# Patient Record
Sex: FEMALE | Race: WHITE | Employment: OTHER | URBAN - METROPOLITAN AREA
[De-identification: names, ages, dates, MRNs, and addresses within clinical notes are randomized per-mention and may not be internally consistent; named-entity substitution may affect disease eponyms.]

---

## 2020-08-05 ENCOUNTER — HOSPITAL ENCOUNTER (EMERGENCY)
Facility: CLINIC | Age: 70
Discharge: HOME | End: 2020-08-05
Attending: EMERGENCY MEDICINE | Admitting: EMERGENCY MEDICINE
Payer: COMMERCIAL

## 2020-08-05 ENCOUNTER — APPOINTMENT (OUTPATIENT)
Dept: MRI IMAGING | Facility: CLINIC | Age: 70
End: 2020-08-05
Attending: EMERGENCY MEDICINE
Payer: COMMERCIAL

## 2020-08-05 VITALS
OXYGEN SATURATION: 97 % | RESPIRATION RATE: 16 BRPM | HEIGHT: 64 IN | SYSTOLIC BLOOD PRESSURE: 132 MMHG | DIASTOLIC BLOOD PRESSURE: 63 MMHG | TEMPERATURE: 98.2 F | HEART RATE: 104 BPM

## 2020-08-05 DIAGNOSIS — F41.1 GAD (GENERALIZED ANXIETY DISORDER): ICD-10-CM

## 2020-08-05 DIAGNOSIS — F32.A DEPRESSION, UNSPECIFIED DEPRESSION TYPE: ICD-10-CM

## 2020-08-05 DIAGNOSIS — E87.6 HYPOKALEMIA: ICD-10-CM

## 2020-08-05 DIAGNOSIS — N30.00 ACUTE CYSTITIS WITHOUT HEMATURIA: ICD-10-CM

## 2020-08-05 LAB
ALBUMIN SERPL-MCNC: 4 G/DL (ref 3.4–5)
ALBUMIN UR-MCNC: 10 MG/DL
ALP SERPL-CCNC: 82 U/L (ref 40–150)
ALT SERPL W P-5'-P-CCNC: 20 U/L (ref 0–50)
AMPHETAMINES UR QL SCN: NEGATIVE
ANION GAP SERPL CALCULATED.3IONS-SCNC: 5 MMOL/L (ref 3–14)
APPEARANCE UR: ABNORMAL
AST SERPL W P-5'-P-CCNC: 14 U/L (ref 0–45)
BARBITURATES UR QL: NEGATIVE
BASOPHILS # BLD AUTO: 0 10E9/L (ref 0–0.2)
BASOPHILS NFR BLD AUTO: 0.4 %
BENZODIAZ UR QL: POSITIVE
BILIRUB SERPL-MCNC: 0.4 MG/DL (ref 0.2–1.3)
BILIRUB UR QL STRIP: NEGATIVE
BUN SERPL-MCNC: 7 MG/DL (ref 7–30)
CALCIUM SERPL-MCNC: 8.8 MG/DL (ref 8.5–10.1)
CANNABINOIDS UR QL SCN: NEGATIVE
CHLORIDE SERPL-SCNC: 109 MMOL/L (ref 94–109)
CO2 SERPL-SCNC: 27 MMOL/L (ref 20–32)
COCAINE UR QL: NEGATIVE
COLOR UR AUTO: YELLOW
CREAT SERPL-MCNC: 0.77 MG/DL (ref 0.52–1.04)
DIFFERENTIAL METHOD BLD: NORMAL
EOSINOPHIL # BLD AUTO: 0.2 10E9/L (ref 0–0.7)
EOSINOPHIL NFR BLD AUTO: 2.4 %
ERYTHROCYTE [DISTWIDTH] IN BLOOD BY AUTOMATED COUNT: 13.4 % (ref 10–15)
GFR SERPL CREATININE-BSD FRML MDRD: 79 ML/MIN/{1.73_M2}
GLUCOSE SERPL-MCNC: 108 MG/DL (ref 70–99)
GLUCOSE UR STRIP-MCNC: NEGATIVE MG/DL
HCT VFR BLD AUTO: 39.2 % (ref 35–47)
HGB BLD-MCNC: 13 G/DL (ref 11.7–15.7)
HGB UR QL STRIP: NEGATIVE
IMM GRANULOCYTES # BLD: 0 10E9/L (ref 0–0.4)
IMM GRANULOCYTES NFR BLD: 0 %
KETONES UR STRIP-MCNC: NEGATIVE MG/DL
LEUKOCYTE ESTERASE UR QL STRIP: ABNORMAL
LYMPHOCYTES # BLD AUTO: 2.2 10E9/L (ref 0.8–5.3)
LYMPHOCYTES NFR BLD AUTO: 31.5 %
MCH RBC QN AUTO: 29.2 PG (ref 26.5–33)
MCHC RBC AUTO-ENTMCNC: 33.2 G/DL (ref 31.5–36.5)
MCV RBC AUTO: 88 FL (ref 78–100)
MONOCYTES # BLD AUTO: 0.4 10E9/L (ref 0–1.3)
MONOCYTES NFR BLD AUTO: 5 %
MUCOUS THREADS #/AREA URNS LPF: PRESENT /LPF
NEUTROPHILS # BLD AUTO: 4.3 10E9/L (ref 1.6–8.3)
NEUTROPHILS NFR BLD AUTO: 60.7 %
NITRATE UR QL: NEGATIVE
NRBC # BLD AUTO: 0 10*3/UL
NRBC BLD AUTO-RTO: 0 /100
OPIATES UR QL SCN: NEGATIVE
PCP UR QL SCN: NEGATIVE
PH UR STRIP: 6 PH (ref 5–7)
PLATELET # BLD AUTO: 237 10E9/L (ref 150–450)
POTASSIUM SERPL-SCNC: 3.2 MMOL/L (ref 3.4–5.3)
PROT SERPL-MCNC: 7.3 G/DL (ref 6.8–8.8)
RBC # BLD AUTO: 4.45 10E12/L (ref 3.8–5.2)
RBC #/AREA URNS AUTO: 3 /HPF (ref 0–2)
SODIUM SERPL-SCNC: 141 MMOL/L (ref 133–144)
SOURCE: ABNORMAL
SP GR UR STRIP: 1.02 (ref 1–1.03)
SQUAMOUS #/AREA URNS AUTO: 1 /HPF (ref 0–1)
TSH SERPL DL<=0.005 MIU/L-ACNC: 0.86 MU/L (ref 0.4–4)
UROBILINOGEN UR STRIP-MCNC: NORMAL MG/DL (ref 0–2)
WBC # BLD AUTO: 7 10E9/L (ref 4–11)
WBC #/AREA URNS AUTO: 42 /HPF (ref 0–5)

## 2020-08-05 PROCEDURE — 70553 MRI BRAIN STEM W/O & W/DYE: CPT

## 2020-08-05 PROCEDURE — 25500064 ZZH RX 255 OP 636: Performed by: EMERGENCY MEDICINE

## 2020-08-05 PROCEDURE — 90791 PSYCH DIAGNOSTIC EVALUATION: CPT

## 2020-08-05 PROCEDURE — 85025 COMPLETE CBC W/AUTO DIFF WBC: CPT | Performed by: EMERGENCY MEDICINE

## 2020-08-05 PROCEDURE — 87086 URINE CULTURE/COLONY COUNT: CPT | Performed by: EMERGENCY MEDICINE

## 2020-08-05 PROCEDURE — 25000132 ZZH RX MED GY IP 250 OP 250 PS 637: Performed by: EMERGENCY MEDICINE

## 2020-08-05 PROCEDURE — U0003 INFECTIOUS AGENT DETECTION BY NUCLEIC ACID (DNA OR RNA); SEVERE ACUTE RESPIRATORY SYNDROME CORONAVIRUS 2 (SARS-COV-2) (CORONAVIRUS DISEASE [COVID-19]), AMPLIFIED PROBE TECHNIQUE, MAKING USE OF HIGH THROUGHPUT TECHNOLOGIES AS DESCRIBED BY CMS-2020-01-R: HCPCS | Performed by: EMERGENCY MEDICINE

## 2020-08-05 PROCEDURE — 84443 ASSAY THYROID STIM HORMONE: CPT | Performed by: EMERGENCY MEDICINE

## 2020-08-05 PROCEDURE — 81001 URINALYSIS AUTO W/SCOPE: CPT | Mod: XU | Performed by: EMERGENCY MEDICINE

## 2020-08-05 PROCEDURE — 80307 DRUG TEST PRSMV CHEM ANLYZR: CPT | Performed by: EMERGENCY MEDICINE

## 2020-08-05 PROCEDURE — 99285 EMERGENCY DEPT VISIT HI MDM: CPT | Mod: 25

## 2020-08-05 PROCEDURE — A9585 GADOBUTROL INJECTION: HCPCS | Performed by: EMERGENCY MEDICINE

## 2020-08-05 PROCEDURE — 80053 COMPREHEN METABOLIC PANEL: CPT | Performed by: EMERGENCY MEDICINE

## 2020-08-05 PROCEDURE — C9803 HOPD COVID-19 SPEC COLLECT: HCPCS

## 2020-08-05 RX ORDER — CEPHALEXIN 500 MG/1
500 CAPSULE ORAL ONCE
Status: COMPLETED | OUTPATIENT
Start: 2020-08-05 | End: 2020-08-05

## 2020-08-05 RX ORDER — CEPHALEXIN 500 MG/1
500 CAPSULE ORAL 2 TIMES DAILY
Qty: 13 CAPSULE | Refills: 0 | Status: SHIPPED | OUTPATIENT
Start: 2020-08-05 | End: 2020-08-12

## 2020-08-05 RX ORDER — GADOBUTROL 604.72 MG/ML
7 INJECTION INTRAVENOUS ONCE
Status: COMPLETED | OUTPATIENT
Start: 2020-08-05 | End: 2020-08-05

## 2020-08-05 RX ORDER — POTASSIUM CHLORIDE 1.5 G/1.58G
40 POWDER, FOR SOLUTION ORAL ONCE
Status: COMPLETED | OUTPATIENT
Start: 2020-08-05 | End: 2020-08-05

## 2020-08-05 RX ADMIN — GADOBUTROL 7 ML: 604.72 INJECTION INTRAVENOUS at 19:49

## 2020-08-05 RX ADMIN — CEPHALEXIN 500 MG: 500 CAPSULE ORAL at 22:41

## 2020-08-05 RX ADMIN — POTASSIUM CHLORIDE 40 MEQ: 1.5 POWDER, FOR SOLUTION ORAL at 21:09

## 2020-08-05 ASSESSMENT — ENCOUNTER SYMPTOMS
COUGH: 1
NERVOUS/ANXIOUS: 1
FEVER: 0
UNEXPECTED WEIGHT CHANGE: 1
SHORTNESS OF BREATH: 1
SLEEP DISTURBANCE: 1
APPETITE CHANGE: 1
HALLUCINATIONS: 1
ABDOMINAL PAIN: 0
CONFUSION: 1

## 2020-08-05 NOTE — ED PROVIDER NOTES
History   Chief Complaint  Hallucinations and Anxiety    The history is provided by the patient.      Enid Kendall is a 70 year old female with a history of anxiety, depression, and cognitive impairment who presents for evaluation of increased anxiety and auditory hallucinations. She has struggled with anxiety for a while now, but this has increased with the pandemic and riots. It has also been particularly heightened in the last few weeks with her nephew passing away. She has started having auditory hallucinations of people coming to hurt their dog. The  notes two episodes of visual hallucinations as well; once when she thought people were in their room and another time she thought a police car was outside. She endorses some thoughts of injuring herself. The  also has concern over her recent weight loss and loss of appetite. About a year ago she was purposely trying to lose weight, but has concern it has gone too far.     Her sertraline dose was increased from 150 mg to 200 mg recently. She has also been titration up on Seroquel recently. She was also started on a medication for sleep recently. This helped for the first few night, but she is now waking up again.     She also has a minor sore throat and some shortness of breath, although believes this is from her asthma. She denies fever, abdominal pain.     Allergies  Codeine  Contrast Dye    Medications  Albuterol  Advair  Lipitor  Lisinopril-hydrochlorothiazide  Zoloft  Gabapentin  Seroquel    Past Medical History  Osteopenia  Mild cognitive impairment  Anxiety  Hyperlipidemia  Hypertension  Depression  Impaired fasting glucose  Asthma   Depression  Cervical radiculopathy  Herpes simplex labials  Angina pectoris     Past Surgical History  Appendectomy  Coronary angiogram  Cholecystectomy  Laser mosaic plantar warts and excision lipomas  Total abdominal hysterectomy    Family History  Father - CAD, hypertension  Mother - breast  "cancer  Sister(s) - melanoma, heart attack  Brother - esophageal cancer   Son - depression    Social History  The patient was accompanied to the ED by her .  Smoking Status: never  Smokeless Tobacco: no  Alcohol Use: no  Drug Use: no    Review of Systems   Constitutional: Positive for appetite change and unexpected weight change. Negative for fever.   Respiratory: Positive for cough and shortness of breath.    Gastrointestinal: Negative for abdominal pain.   Psychiatric/Behavioral: Positive for confusion, hallucinations and sleep disturbance. The patient is nervous/anxious.    All other systems reviewed and are negative.      Physical Exam     Patient Vitals for the past 24 hrs:   BP Temp Temp src Pulse Resp SpO2 Height   08/05/20 1641 132/63 98.2  F (36.8  C) Oral 104 16 98 % 1.626 m (5' 4\")       Physical Exam  Constitutional: Well developed, nontox appearance  Head: Atraumatic.  Neck:  no stridor  Eyes: no scleral icterus  Cardiovascular: Borderline regular tachycardia, 2+ bilat radial pulses  Pulmonary/Chest: nml resp effort  Abdominal: ND, soft, NT, no rebound or guarding   Ext: Warm, well perfused, no edema  Neurological: Alert, oriented x2, symmetric facies, moves ext x4  Skin: Skin is warm and dry.   Psychiatric: Severely anxious, endorses SI without plan.  Denies HI, does not appear to be responding to internal stimuli  Nursing note and vitals reviewed.    Emergency Department Course   Imaging:  Radiology findings were communicated with the patient and spouse who voiced understanding of the findings.    MR brain w/o and w contrast:  IMPRESSION:   1.  No acute pathology, no bleed, mass, or acute infarcts.   2. There is diffuse parenchymal volume loss.  White matter changes are   present in the cerebral hemispheres that are consistent with small   vessel ischemic disease in this age patient.   Readings per Radiology    Laboratory:  Laboratory findings were communicated with the patient who voiced " understanding of the findings.    Drug abuse screen 77 urine: positive for benzodiazepine o/w negative  UA with Microscopic: protein albumin 10 (A), leukocyte esterase large (A), WBC/HPF 42 (H), RBC/HPF 3 (H), mucous present (A) o/w WNL    CMP: glucose 108 (H), potassium 3.2 (L) o/w WNL (Creatinine 0.77)  CBC: AWNL (WBC 7.0, HGB 13.0, )  TSH with free T4 reflex: 0.86    Asymptomatic COVID-19 virus by PCR: pending    Interventions:   Klor-Con 40 mEq po    Emergency Department Course:  Past medical records, nursing notes, and vitals reviewed.    1720 I physically examined the patient as documented above.    IV was inserted and blood was drawn for laboratory testing, results above.    The patient provided a urine sample here in the emergency department. This was sent for laboratory testing, findings above.    The patient was sent for radiographs while in the emergency department, results above.     A Nasopharyngeal swab was obtained here in the ED.    2120 I rechecked the patient and discussed the findings of their workup thus far.     Findings and plan explained to the Patient and spouse. Patient discharged home with instructions regarding supportive care, medications, and reasons to return. The importance of close follow-up was reviewed. The patient was prescribed Keflex.     I personally reviewed the laboratory and imaging results with the Patient and spouse and answered all related questions prior to discharge.     Impression & Plan   Medical Decision Makin-year-old female presenting with severe anxiety, auditory hallucinations, confusion     Differential diagnosis includes generalized anxiety disorder, anxiety NOS, major depressive disorder, psychiatric disorder NOS, electrolyte abnormality, intracranial abnormality such as mass; infection such as UTI although seemingly less likely play acute role in the patient's presentation given duration of gradually worsening anxiety, stressors and lack of  acute delirium.  Labs significant for mild hypokalemia, UA consistent with infection.  MRI demonstrated no acute abnormality, evidence of CVA or mass.  Given the patient's profound anxiety, difficulty sleeping and multiple recent medication changes, I think would be reasonable to have the patient evaluated by DEC for disposition planning inpatient psychiatric admission versus outpatient follow-up.  Given the patient's intermittent suicidal thoughts, although she reports that she would not act on them, inpatient admission would potentially be a viable option.  DEC evaluated the patient, and given the patient has no desire for admission and can likely be safe at home, plan for discharge from ED w/ safety plan and f/u with psychiatry which I think is reasonable.  Prescription written as noted below for UTI treatment.  At this time I feel the pt is safe for discharge.  Recommendations given regarding follow up with PCP and return to the emergency department as needed for new or worsening symptoms.  Pt counseled on all results, disposition and diagnosis.  They are understanding and agreeable to plan. Patient discharged in stable condition.        Diagnosis:    ICD-10-CM    1. LAURI (generalized anxiety disorder)  F41.1    2. Depression, unspecified depression type  F32.9    3. Acute cystitis without hematuria  N30.00    Hypokalemia    Disposition:  Discharged to home.    Discharge Medications:  New Prescriptions    CEPHALEXIN (KEFLEX) 500 MG CAPSULE    Take 1 capsule (500 mg) by mouth 2 times daily for 13 doses       Scribe Disclosure:  I, Louise Yeung, am serving as a scribe at 5:09 PM on 8/5/2020 to document services personally performed by Desmond Waldrop MD based on my observations and the provider's statements to me.      Desmond Waldrop MD  08/06/20 0001

## 2020-08-05 NOTE — ED TRIAGE NOTES
Pt here with her , she has anxiety and it has increased since all the world changes with covid and a family death. Her hallucinations are voices stating they will hurt her dog( s)  Pt and her  had video consult with her psychiatrist who after 30 min determined pt needs to be seen . meds have been getting increased over the last few months and pt also complains of dizziness and poor appetite.

## 2020-08-05 NOTE — ED AVS SNAPSHOT
Emergency Department  [address] [place] [id number]  Phone:  [phone]  Fax:  [phone]                                    Enid Kendall   MRN: [id number]    Department:   Emergency Department   Date of Visit:  8/5/2020           After Visit Summary Signature Page    I have received my discharge instructions, and my questions have been answered. I have discussed any challenges I see with this plan with the nurse or doctor.    ..........................................................................................................................................  Patient/Patient Representative Signature      ..........................................................................................................................................  Patient Representative Print Name and Relationship to Patient    ..................................................               ................................................  Date                                   Time    ..........................................................................................................................................  Reviewed by Signature/Title    ...................................................              ..............................................  Date                                               Time          22EPIC Rev 08/18

## 2020-08-05 NOTE — ED NOTES
Bed: ED22  Expected date:   Expected time:   Means of arrival:   Comments:  Hold  Mental health in triage

## 2020-08-06 LAB
LABORATORY COMMENT REPORT: NORMAL
SARS-COV-2 RNA SPEC QL NAA+PROBE: NEGATIVE
SARS-COV-2 RNA SPEC QL NAA+PROBE: NORMAL
SPECIMEN SOURCE: NORMAL
SPECIMEN SOURCE: NORMAL

## 2020-08-06 NOTE — RESULT ENCOUNTER NOTE
Emergency Dept/Urgent Care discharge antibiotic (if prescribed): Cephalexin (Keflex) 500 mg capsule, 1 capsule (500 mg) by mouth 2 times daily for 7 days.  Date of Rx (if applicable):  8/5/20  No changes in treatment per Urine culture protocol.

## 2020-08-06 NOTE — DISCHARGE INSTRUCTIONS
Please return to the emergency department as needed for new or worsening symptoms including worsening anxiety, worsening thoughts of self-harm or suicide, worsening hallucinations, inability to function at home, fever greater than 100.4  F, vomiting unable to keep anything down, fainting, any other concerning symptoms.        Discharge Instructions  Urinary Tract Infection  You or your child have been diagnosed with a urinary tract infection, or UTI. The urinary tract includes the kidneys (which make urine/pee), ureters (the tubes that carry urine/pee from the kidneys to the bladder), the bladder (which stores urine/pee), and urethra (the tube that carries urine/pee out of the bladder). Urinary tract infections occur when bacteria travel up the urethra into the bladder (bladder infection) and, in some cases, from there into the kidneys (kidney infection).  Generally, every Emergency Department visit should have a follow-up clinic visit with either a primary or a specialty clinic/provider. Please follow-up as instructed by your emergency provider today.  Return to the Emergency Department if:  You or your child have severe back pain.  You or your child are vomiting (throwing up) so that you cannot take your medicine.  You or your child have a new fever (had not previously had a fever) over 101 F.  You or your child have confusion or are very weak, or feel very ill.  Your child seems much more ill, will not wake up, will not respond right, or is crying for a long time and will not calm down.  You or your child are showing signs of dehydration. These signs may include decreased urination (pee), dry mouth/gums/tongue, or decreased activity.    Follow-up with your provider:   Children under 24 months need to be seen by their regular provider within one week after a diagnosis of a UTI. It may be necessary to do some more tests to look at the child s kidney or bladder.  You should begin to feel better within 24 - 48 hours  of starting your antibiotic; follow-up with your regular clinic/doctor/provider if this is not the case.    Treatment:   You will be treated with an antibiotic to kill the bacteria. We have to make an educated guess, based on what we know about common bacteria and antibiotics, as to which antibiotic will work for your infection. We will be correct most times but there will be some cases where the antibiotic chosen is not correct (see urine cultures below).  Take a pain medication such as acetaminophen (Tylenol ) or ibuprofen (Advil , Motrin , Nuprin ).  Phenazopyridine (Pyridium , Uristat ) is a prescription medication that numbs the bladder to reduce the burning pain of some UTIs.  The same medication is available in a non-prescription version (Azo-Standard , Urodol ). This medication will change the color of the urine and tears (usually blue or orange). If you wear contacts, do not wear them while taking this medication as they may be stained by the medication.    Urine Cultures:  If indicated, a urine culture may have been performed today. This test generally takes 24-48 hours to complete so the results are not known at this time. The results can confirm that an infection is present but also determine which antibiotic is effective for the specific bacteria that is causing the infection. If your urine culture shows that the antibiotic you were given today will not work to treat your infection, we will attempt to contact you to make arrangements to change the antibiotic. If the culture confirms that the antibiotic is effective for your infection, you will not be contacted. We often recommend follow-up with your regular physician/provider on the culture results regardless of this process.    Antibiotic Warning:   If you have been placed on antibiotics - watch for signs of allergic reaction.  These include rash, lip swelling, difficulty breathing, wheezing, and dizziness.  If you develop any of these symptoms, stop  "the antibiotic immediately and go to an emergency room or urgent care for evaluation.    Probiotics: If you have been given an antibiotic, you may want to also take a probiotic pill or eat yogurt with live cultures. Probiotics have \"good bacteria\" to help your intestines stay healthy. Studies have shown that probiotics help prevent diarrhea and other intestine problems (including C. diff infection) when you take antibiotics. You can buy these without a prescription in the pharmacy section of the store.   If you were given a prescription for medicine here today, be sure to read all of the information (including the package insert) that comes with your prescription.  This will include important information about the medicine, its side effects, and any warnings that you need to know about.  The pharmacist who fills the prescription can provide more information and answer questions you may have about the medicine.  If you have questions or concerns that the pharmacist cannot address, please call or return to the Emergency Department.   Remember that you can always come back to the Emergency Department if you are not able to see your regular provider in the amount of time listed above, if you get any new symptoms, or if there is anything that worries you.    "

## 2020-08-08 LAB
BACTERIA SPEC CULT: NORMAL
BACTERIA SPEC CULT: NORMAL
Lab: NORMAL
SPECIMEN SOURCE: NORMAL

## 2020-08-09 ENCOUNTER — TELEPHONE (OUTPATIENT)
Dept: EMERGENCY MEDICINE | Facility: CLINIC | Age: 70
End: 2020-08-09

## 2020-08-09 NOTE — TELEPHONE ENCOUNTER
[facility] Emergency Department Lab result notification     Patient/parent Name  tanya Iyer    Reason for call  Patient's  requesting lab result    Lab Result  Final urine culture report is NEGATIVE per [facility] ED Lab Result protocol.     If NEGATIVE result, no change in treatment, per [facility] ED Lab Result protocol.    Current symptoms  10:48AM: Patient's  requesting lab result.   Recommendations/Instructions  Result given to patient's  with the patient's verbal permission.  Advised to follow up with the PCP as directed by the ED provider.  The  is comfortable with the information given and has no further questions.     Contact your PCP clinic or return to the Emergency department if your:    Symptoms worsen or other concerning symptom's.    PCP follow-up Questions asked: YES       [RN Name]  Kady Hill RN  [facility] RN  Lung Nodule and ED Lab Result RN  Epic pool (ED late result f/u RN): [id number]  [facility] INCIDENTAL RADIOLOGY F/U NURSES: [id number]  # [phone]      Copy of Lab result   Urine Culture   Order: [id number]   Status:  Final result   Visible to patient:  Yes (MyChart) Dx:  LAURI (generalized anxiety disorder)   Specimen Information:  [facility] Urine          Component  4d ago   Specimen Description  [facility] Urine     Special Requests  Specimen received in preservative     Culture Micro  50,000 to 100,000 colonies/mL   urogenital adry   Susceptibility testing not routinely done    Culture Micro  <10,000 colonies/mL   mixed urogenital adry   Susceptibility testing not routinely done    [facility]          Specimen Collected: 08/05/20  5:59 PM  Last Resulted: 08/08/20  4:04 AM

## 2020-09-12 ENCOUNTER — APPOINTMENT (OUTPATIENT)
Dept: GENERAL RADIOLOGY | Facility: CLINIC | Age: 70
End: 2020-09-12
Attending: EMERGENCY MEDICINE
Payer: COMMERCIAL

## 2020-09-12 ENCOUNTER — APPOINTMENT (OUTPATIENT)
Dept: CT IMAGING | Facility: CLINIC | Age: 70
End: 2020-09-12
Attending: EMERGENCY MEDICINE
Payer: COMMERCIAL

## 2020-09-12 ENCOUNTER — HOSPITAL ENCOUNTER (EMERGENCY)
Facility: CLINIC | Age: 70
Discharge: HOME | End: 2020-09-12
Attending: EMERGENCY MEDICINE | Admitting: EMERGENCY MEDICINE
Payer: COMMERCIAL

## 2020-09-12 VITALS
DIASTOLIC BLOOD PRESSURE: 56 MMHG | HEIGHT: 64 IN | RESPIRATION RATE: 16 BRPM | TEMPERATURE: 97.2 F | SYSTOLIC BLOOD PRESSURE: 117 MMHG | HEART RATE: 80 BPM | OXYGEN SATURATION: 100 %

## 2020-09-12 DIAGNOSIS — S40.022A ARM CONTUSION, LEFT, INITIAL ENCOUNTER: ICD-10-CM
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
## 2020-09-12 LAB
ANION GAP SERPL CALCULATED.3IONS-SCNC: 4 MMOL/L (ref 3–14)
BASOPHILS # BLD AUTO: 0 10E9/L (ref 0–0.2)
BASOPHILS NFR BLD AUTO: 0.5 %
BUN SERPL-MCNC: 12 MG/DL (ref 7–30)
CALCIUM SERPL-MCNC: 8 MG/DL (ref 8.5–10.1)
CHLORIDE SERPL-SCNC: 108 MMOL/L (ref 94–109)
CO2 SERPL-SCNC: 26 MMOL/L (ref 20–32)
CREAT SERPL-MCNC: 0.79 MG/DL (ref 0.52–1.04)
DIFFERENTIAL METHOD BLD: NORMAL
EOSINOPHIL # BLD AUTO: 0.2 10E9/L (ref 0–0.7)
EOSINOPHIL NFR BLD AUTO: 2.9 %
ERYTHROCYTE [DISTWIDTH] IN BLOOD BY AUTOMATED COUNT: 13.7 % (ref 10–15)
GFR SERPL CREATININE-BSD FRML MDRD: 76 ML/MIN/{1.73_M2}
GLUCOSE SERPL-MCNC: 124 MG/DL (ref 70–99)
HCT VFR BLD AUTO: 35.8 % (ref 35–47)
HGB BLD-MCNC: 11.9 G/DL (ref 11.7–15.7)
IMM GRANULOCYTES # BLD: 0 10E9/L (ref 0–0.4)
IMM GRANULOCYTES NFR BLD: 0.2 %
INTERPRETATION ECG - MUSE: NORMAL
LYMPHOCYTES # BLD AUTO: 2.2 10E9/L (ref 0.8–5.3)
LYMPHOCYTES NFR BLD AUTO: 26.8 %
MCH RBC QN AUTO: 29 PG (ref 26.5–33)
MCHC RBC AUTO-ENTMCNC: 33.2 G/DL (ref 31.5–36.5)
MCV RBC AUTO: 87 FL (ref 78–100)
MONOCYTES # BLD AUTO: 0.5 10E9/L (ref 0–1.3)
MONOCYTES NFR BLD AUTO: 6.4 %
NEUTROPHILS # BLD AUTO: 5.2 10E9/L (ref 1.6–8.3)
NEUTROPHILS NFR BLD AUTO: 63.2 %
NRBC # BLD AUTO: 0 10*3/UL
NRBC BLD AUTO-RTO: 0 /100
PLATELET # BLD AUTO: 231 10E9/L (ref 150–450)
POTASSIUM SERPL-SCNC: 3.4 MMOL/L (ref 3.4–5.3)
RBC # BLD AUTO: 4.1 10E12/L (ref 3.8–5.2)
SODIUM SERPL-SCNC: 138 MMOL/L (ref 133–144)
WBC # BLD AUTO: 8.3 10E9/L (ref 4–11)

## 2020-09-12 PROCEDURE — 73060 X-RAY EXAM OF HUMERUS: CPT | Mod: LT

## 2020-09-12 PROCEDURE — 80048 BASIC METABOLIC PNL TOTAL CA: CPT | Performed by: EMERGENCY MEDICINE

## 2020-09-12 PROCEDURE — 70450 CT HEAD/BRAIN W/O DYE: CPT

## 2020-09-12 PROCEDURE — 85025 COMPLETE CBC W/AUTO DIFF WBC: CPT | Performed by: EMERGENCY MEDICINE

## 2020-09-12 PROCEDURE — 73030 X-RAY EXAM OF SHOULDER: CPT | Mod: LT

## 2020-09-12 PROCEDURE — 72125 CT NECK SPINE W/O DYE: CPT

## 2020-09-12 PROCEDURE — 71046 X-RAY EXAM CHEST 2 VIEWS: CPT

## 2020-09-12 PROCEDURE — 99285 EMERGENCY DEPT VISIT HI MDM: CPT | Mod: 25

## 2020-09-12 PROCEDURE — 93005 ELECTROCARDIOGRAM TRACING: CPT

## 2020-09-12 ASSESSMENT — ENCOUNTER SYMPTOMS: MYALGIAS: 1

## 2020-09-12 NOTE — ED PROVIDER NOTES
History     Chief Complaint:  Fall     The history is provided by the patient and the spouse. The history is limited by the condition of the patient.      Enid Kendall is a 70 year old female with a history of anxiety, depression, and cognitive impairment who presents with left upper arm pain and left sided chest pain after a fall. The patient says that she does not recall how she fell, but that it was dark and she likely tripped on something. She endorses some dizziness with sitting up.     Per the patient's , the patient's takes Seroquel at night to calm her anxiety. Occasionally it does not work as desired and makes the patient groggy. He says that when that happened the patient gets paranoid and has hallucinations. The  says that what likely happened was that the patient got out of bed because she was hearing voices and she tripped and fell.     Allergies:  Codeine  Contrast Dye     Medications:    Zoloft  Valtrex  Lisinopril hydrochlorothiazide  Lipitor  Advair  Albuterol  Seroquel  Gabapentin   Aricept     Past Medical History:    Osteopenia  Mild cognitive impairment  Anxiety  Hyperlipidemia  Hypertension  Depression  Impaired fasting glucose  Asthma   Depression  Cervical radiculopathy  Herpes simplex labials  Angina pectoris     Past Surgical History:    Appendectomy  Coronary angiogram  Cholecystectomy  Laser mosaic plantar warts and excision lipomas  Total abdominal hysterectomy     Family History:    CAD  Hypertension   Breast cancer  Melanoma  Heart attack  Esophageal cancer  Depression      Social History:  Smoking Status: Never Smoker  Alcohol Use: No  PCP: Usha Rocha     Review of Systems   Cardiovascular: Positive for chest pain.   Musculoskeletal: Positive for myalgias.   ROS limited due to condition of the patient.         Physical Exam     Patient Vitals for the past 24 hrs:   BP Temp Temp src Pulse Resp SpO2 Height   09/12/20 0440 -- -- -- 74 12 97 % --   09/12/20  "0415 -- -- -- 68 11 98 % --   09/12/20 0405 115/50 -- -- 66 20 99 % --   09/12/20 0220 109/46 97.2  F (36.2  C) Temporal 90 16 99 % 1.626 m (5' 4\")        Physical Exam  General: Appears well-developed and well-nourished.   Head: No signs of trauma.   Mouth/Throat: Oropharynx is clear and moist.   Eyes: Conjunctivae are normal. Pupils are equal, round, and reactive to light.   Neck: Normal range of motion. No point tenderness.  CV: Normal rate and regular rhythm.    Resp: Effort normal and breath sounds normal. No respiratory distress.   GI: Soft. There is no tenderness.  No rebound or guarding.  Normal bowel sounds.    MSK: Normal range of motion. No point tenderness to extremities.  Ecchymosis to left bicep region.  Distal pulses intact.  Normal strength, cap refill, and sensation distally.  Neuro: The patient is alert and conversant, but poor overall historian.  PERRLA, EOMI, strength in upper/lower extremities normal and symmetrical. Sensation normal. Speech normal.  Skin: Skin is warm and dry. No rash noted.   Psych: normal mood and affect. behavior is normal.       Emergency Department Course     ECG:  ECG taken at 0238, ECG read at 0238  Normal sinus rhythm  Normal ECG  No significant change compared to EKG dated 10/30/1995  Rate 80 bpm. GA interval 124 ms. QRS duration 90 ms. QT/QTc 402/463 ms. P-R-T axes 55 43 39.    Imaging:  Radiology findings were communicated with the patient and  who voiced understanding of the findings.    CT Cervical Spine w/o Contrast  1.  No evidence of an acute osseous abnormality of the cervical spine.  2.  Heterogeneous appearance of the thyroid gland without a definite discrete nodule. Findings may reflect thyroiditis.  Reading per radiology    CT Head w/o Contrast  1.  No CT evidence for acute intracranial process.  2.  Brain atrophy and presumed chronic microvascular ischemic changes as above.  Reading per radiology    Chest XR,  PA & LAT  No radiographic evidence of " acute trauma in the chest.   Reading per radiology    XR Shoulder Left G/E 3 Views  1. No visualized acute fracture or malalignment of the left shoulder or humerus.  2. Moderate degenerative changes in the left acromioclavicular joint.  3. Atherosclerotic calcification in the thoracic aorta.   Reading per radiology    Humerus XR,  G/E 2 views, left  1. No visualized acute fracture or malalignment of the left shoulder or humerus.  2. Moderate degenerative changes in the left acromioclavicular joint.  3. Atherosclerotic calcification in the thoracic aorta.   Reading per radiology     Laboratory:  Laboratory findings were communicated with the patient who voiced understanding of the findings.    CBC: WBC 8.3, HGB 11.9,   BMP:  (H), calcium 8.0 (L) o/w WNL (Creatinine 0.79)    Procedures    Emergency Department Course:    0226 Nursing notes and vitals reviewed.    0238 I performed an exam of the patient as documented above. IV was inserted and blood was drawn for laboratory testing, results above.     0332 The patient was sent for a XR while in the emergency department, results above.      0352 The patient was sent for a CT while in the emergency department, results above.      0440 Patient rechecked and updated.       Findings and plan explained to the Patient and spouse. Patient discharged home with instructions regarding supportive care, medications, and reasons to return. The importance of close follow-up was reviewed.     Impression & Plan        Medical Decision Making:  Enid Kendall is a 70 year old female who presents to the emergency department today for evaluation after a fall.  Exact details of the fall not clear although patient is does apparently have some developing dementia and also uses Seroquel to help her sleep, but occasionally she has increased anxiety and hallucinations that she is working with a psychiatrist with.  Apparently this was the case this evening.  On my evaluation she  did have some ecchymosis to the left bicep area and complained of some pain in that region.  At one point she did complain of some neck pain although I did not elicit any on my evaluation.  There is no other clear signs of trauma that I was able to note.  Imaging was obtained of the affected areas and this did not show any signs of acute process.  Patient continued not have any further complaints and desired to go home and patient's  was comfortable with this as well.  I did recommend that he speak with the patient's psychiatrist to discuss continued management of her anxiety/paranoia symptoms and to follow-up with her primary care doctor.        Diagnosis:    ICD-10-CM    1. Fall, initial encounter  W19.XXXA    2. Arm contusion, left, initial encounter  S40.022A      Disposition:   The patient is discharged to home.     Discharge Medications:  New Prescriptions    No medications on file       Scribe Disclosure:  I, Sukhdeep Escamilla, am serving as a scribe at 2:37 AM on 9/12/2020 to document services personally performed by Fidel Donaldson MD based on my observations and the provider's statements to me.        Fidel Donaldson MD  09/13/20 0034

## 2020-09-12 NOTE — ED AVS SNAPSHOT
Emergency Department  [address] [place] [address]  Phone:  [phone]  Fax:  [phone]                                    Enid Kendall   MRN: [id number]    Department:   Emergency Department   Date of Visit:  9/12/2020           After Visit Summary Signature Page    I have received my discharge instructions, and my questions have been answered. I have discussed any challenges I see with this plan with the nurse or doctor.    ..........................................................................................................................................  Patient/Patient Representative Signature      ..........................................................................................................................................  Patient Representative Print Name and Relationship to Patient    ..................................................               ................................................  Date                                   Time    ..........................................................................................................................................  Reviewed by Signature/Title    ...................................................              ..............................................  Date                                               Time          22EPIC Rev 08/18

## 2020-09-15 ENCOUNTER — HOSPITAL ENCOUNTER (EMERGENCY)
Facility: CLINIC | Age: 70
Discharge: HOME | End: 2020-09-15
Attending: EMERGENCY MEDICINE | Admitting: EMERGENCY MEDICINE
Payer: COMMERCIAL

## 2020-09-15 VITALS
TEMPERATURE: 98.2 F | RESPIRATION RATE: 14 BRPM | OXYGEN SATURATION: 96 % | SYSTOLIC BLOOD PRESSURE: 120 MMHG | DIASTOLIC BLOOD PRESSURE: 66 MMHG | HEART RATE: 76 BPM

## 2020-09-15 DIAGNOSIS — F41.1 ANXIETY REACTION: ICD-10-CM

## 2020-09-15 LAB
ALBUMIN UR-MCNC: NEGATIVE MG/DL
ANION GAP SERPL CALCULATED.3IONS-SCNC: 5 MMOL/L (ref 3–14)
APPEARANCE UR: CLEAR
BASOPHILS # BLD AUTO: 0 10E9/L (ref 0–0.2)
BASOPHILS NFR BLD AUTO: 0.3 %
BILIRUB UR QL STRIP: NEGATIVE
BUN SERPL-MCNC: 14 MG/DL (ref 7–30)
CALCIUM SERPL-MCNC: 8.1 MG/DL (ref 8.5–10.1)
CHLORIDE SERPL-SCNC: 111 MMOL/L (ref 94–109)
CO2 SERPL-SCNC: 25 MMOL/L (ref 20–32)
COLOR UR AUTO: NORMAL
CREAT SERPL-MCNC: 0.79 MG/DL (ref 0.52–1.04)
DIFFERENTIAL METHOD BLD: ABNORMAL
EOSINOPHIL # BLD AUTO: 0.3 10E9/L (ref 0–0.7)
EOSINOPHIL NFR BLD AUTO: 4.4 %
ERYTHROCYTE [DISTWIDTH] IN BLOOD BY AUTOMATED COUNT: 13.8 % (ref 10–15)
GFR SERPL CREATININE-BSD FRML MDRD: 76 ML/MIN/{1.73_M2}
GLUCOSE SERPL-MCNC: 98 MG/DL (ref 70–99)
GLUCOSE UR STRIP-MCNC: NEGATIVE MG/DL
HCT VFR BLD AUTO: 34.4 % (ref 35–47)
HGB BLD-MCNC: 11.5 G/DL (ref 11.7–15.7)
HGB UR QL STRIP: NEGATIVE
IMM GRANULOCYTES # BLD: 0 10E9/L (ref 0–0.4)
IMM GRANULOCYTES NFR BLD: 0.2 %
KETONES UR STRIP-MCNC: NEGATIVE MG/DL
LEUKOCYTE ESTERASE UR QL STRIP: NEGATIVE
LYMPHOCYTES # BLD AUTO: 3.1 10E9/L (ref 0.8–5.3)
LYMPHOCYTES NFR BLD AUTO: 50.5 %
MCH RBC QN AUTO: 29.4 PG (ref 26.5–33)
MCHC RBC AUTO-ENTMCNC: 33.4 G/DL (ref 31.5–36.5)
MCV RBC AUTO: 88 FL (ref 78–100)
MONOCYTES # BLD AUTO: 0.3 10E9/L (ref 0–1.3)
MONOCYTES NFR BLD AUTO: 5.2 %
NEUTROPHILS # BLD AUTO: 2.4 10E9/L (ref 1.6–8.3)
NEUTROPHILS NFR BLD AUTO: 39.4 %
NITRATE UR QL: NEGATIVE
NRBC # BLD AUTO: 0 10*3/UL
NRBC BLD AUTO-RTO: 0 /100
PH UR STRIP: 6 PH (ref 5–7)
PLATELET # BLD AUTO: 219 10E9/L (ref 150–450)
POTASSIUM SERPL-SCNC: 3.5 MMOL/L (ref 3.4–5.3)
RBC # BLD AUTO: 3.91 10E12/L (ref 3.8–5.2)
RBC #/AREA URNS AUTO: <1 /HPF (ref 0–2)
SODIUM SERPL-SCNC: 141 MMOL/L (ref 133–144)
SOURCE: NORMAL
SP GR UR STRIP: 1.01 (ref 1–1.03)
UROBILINOGEN UR STRIP-MCNC: NORMAL MG/DL (ref 0–2)
WBC # BLD AUTO: 6.1 10E9/L (ref 4–11)
WBC #/AREA URNS AUTO: 0 /HPF (ref 0–5)

## 2020-09-15 PROCEDURE — 25000132 ZZH RX MED GY IP 250 OP 250 PS 637: Performed by: EMERGENCY MEDICINE

## 2020-09-15 PROCEDURE — 85025 COMPLETE CBC W/AUTO DIFF WBC: CPT | Performed by: EMERGENCY MEDICINE

## 2020-09-15 PROCEDURE — 81001 URINALYSIS AUTO W/SCOPE: CPT | Performed by: EMERGENCY MEDICINE

## 2020-09-15 PROCEDURE — 80048 BASIC METABOLIC PNL TOTAL CA: CPT | Performed by: EMERGENCY MEDICINE

## 2020-09-15 PROCEDURE — 99285 EMERGENCY DEPT VISIT HI MDM: CPT | Mod: 25

## 2020-09-15 PROCEDURE — 90791 PSYCH DIAGNOSTIC EVALUATION: CPT

## 2020-09-15 RX ADMIN — OLANZAPINE 2.5 MG: 5 TABLET, ORALLY DISINTEGRATING ORAL at 04:47

## 2020-09-15 ASSESSMENT — ENCOUNTER SYMPTOMS
COUGH: 0
NERVOUS/ANXIOUS: 1
FEVER: 0
NAUSEA: 0
VOMITING: 0
ABDOMINAL PAIN: 0

## 2020-09-15 NOTE — ED NOTES
"Pt is very pleasant and cooperative. Pt states she has been extremely anxious about political events and \"mean people\" in the world. States she has been suicidal for months. Pt states she wants help.  "

## 2020-09-15 NOTE — ED TRIAGE NOTES
called EMS d/t pt making some suicidal statements. Pt has been anxious about political climate and things going on in the world.

## 2020-09-15 NOTE — ED AVS SNAPSHOT
Emergency Department  [address] [place] [address]  Phone:  [phone]  Fax:  [phone]                                    Enid Kendall   MRN: [id number]    Department:   Emergency Department   Date of Visit:  9/15/2020           After Visit Summary Signature Page    I have received my discharge instructions, and my questions have been answered. I have discussed any challenges I see with this plan with the nurse or doctor.    ..........................................................................................................................................  Patient/Patient Representative Signature      ..........................................................................................................................................  Patient Representative Print Name and Relationship to Patient    ..................................................               ................................................  Date                                   Time    ..........................................................................................................................................  Reviewed by Signature/Title    ...................................................              ..............................................  Date                                               Time          22EPIC Rev 08/18

## 2020-09-15 NOTE — ED PROVIDER NOTES
History     Chief Complaint:  Psychiatric Evaluation    The history is provided by the patient. History limited by: poor historian.      Enid Kendall is a 70 year old female diagnosed with cognitive impairment, dementia, depression amongst others as noted below, who presents alone from home via EMS for psychiatric evaluation. EMS was called to the scene after patient's spouse called 911 as she was making suicidal statements.     Here, patient states she was just feeling overwhelmed and anxious about the political climate and other ongoing global events. She was evaluated here for a fall a few days ago and states she has been doing well since. She denies any fever, cough or specific complaint. Of note, patient was evaluated by her neurologist yesterday, Dr. Sang Kinney, and had her donezpezil increased to 10 mg per day to be taken in the morning versus the afternoon.     Allergies:  Codeine  Contrast Dye     Medications:    Aricept  Seroquel  Lisinopril  Albuterol inhaler  Advair inhaler  Albuterol neb solution  Lipitor     Past Medical History:    Angina pectoris  Asthma  Cervical radiculopathy  Depression  Hyperlipidemia  Hypertension  Cognitive impairment    Past Surgical History:    Appendectomy  Colonoscopy  Dexa bone density axial skel  Cholecystectomy  Dye laser mosaic plantar warts and excision lipomas  Total abdominal hysterectomy and BSO    Family History:    Father - CAD, MI, hypertension, hyperlipidemia  Mother - breast cancer  Brother(s) - esophageal cancer, hypertension  Sister(s) - Alzheimer's cancer, glaucoma, hypertension, MI, MS    Social History:  The patient was accompanied to the ED by EMS.  Smoking Status: Never  Smokeless Tobacco: Never  Alcohol Use: No   Marital Status:   [2]     Review of Systems   Constitutional: Negative for fever.   Respiratory: Negative for cough.    Cardiovascular: Negative for chest pain.   Gastrointestinal: Negative for abdominal pain, nausea and  vomiting.   Psychiatric/Behavioral: Positive for suicidal ideas. The patient is nervous/anxious.    All other systems reviewed and are negative.      Physical Exam     Patient Vitals for the past 24 hrs:   BP Temp Temp src Pulse Resp SpO2   09/15/20 0440 119/56 -- -- 83 -- --   09/15/20 0218 131/64 98.2  F (36.8  C) Oral 78 14 99 %       Physical Exam  Head: No signs of trauma.   CV: Normal rate and regular rhythm.    Resp: Effort normal and breath sounds normal. No respiratory distress.   GI: Soft. There is no tenderness.  No rebound or guarding.  Normal bowel sounds.  No CVA tenderness.  MSK: Normal range of motion. no edema. No Calf tenderness.  Neuro: The patient is alert and oriented.  Can relate immediate history, but trails off topic overtime.  Strength in upper/lower extremities normal and symmetrical. Sensation normal. Speech normal.  Skin: Skin is warm and dry. No rash noted.   Psych: Calming, sitting up in bed, looking about room      Emergency Department Course     Laboratory:  Laboratory findings were communicated with the patient who voiced understanding of the findings.    CBC: HGB 11.5 (L) o/w WNL (WBC 6.1, )  BMP: Chloride 111 (H), Calcium 8.1 (L) o/w WNL (Creatinine 0.79)    UA with Microscopic: AWNL      Interventions:  0447 Zyprexa 2.5 mg PO    Emergency Department Course:  Past medical records, nursing notes, and vitals reviewed.    (0225)   I performed an exam of the patient as documented above.     IV was inserted and blood was drawn for laboratory testing, results above.    The patient provided a urine sample here in the emergency department. This was sent for laboratory testing, findings above.    (0438)   I spoke with DEC service regarding patient's presentation, findings, and plan of care.      (0450)   I rechecked the patient and discussed the results of her workup thus far. Patient reports she is not currently suicidal.     (0455)   Called patient's spouse and discussed plan of  care. Discussed that patient is appropriate for discharge and spouse will come  patient as she has an appointment scheduled at 1400.     (0511)   Rechecked patient.    (0540)   Rechecked patient. Discussed plan of care and patient will be discharged to the care of her spouse, once he arrives.    Findings and plan explained to the Patient and spouse. Patient discharged home with instructions regarding supportive care, medications, and reasons to return. The importance of close follow-up was reviewed.     I personally reviewed the laboratory results with the Patient and spouse and answered all related questions prior to discharge.     Impression & Plan     Medical Decision Making:  Enid Kendall is a 70-year-old woman who presents due to becoming anxious and apparently making statements of suicidal ideation.  Patient has been diagnosed with dementia recently and has been working with neurology and psychiatry.  At the time of my evaluation, patient was overall calm and stated that she was somewhat upset about the events of the world.  She did seem somewhat tangential.  I was able to speak with the patient's  and reports that the patient seemed to be somewhat more upset for longer than typical tonight, but that she had calm down by the time EMS had arrived.  DEC did speak with the patient and the  and both are in agreement that the patient does not seem to represent an immediate threat to herself or others.   reports that the patient has an appointment to see her psychiatrist at 1400 today to discuss medication changes.  I did discuss options with the patient and her .   was comfortable with taking the patient home at this point as he agreed that trying to follow with the patient's psychiatrist would be in her best interest as the psychiatrist that is primarily with the patient and he is very happy with her care.  Patient was discharged home with the  and he was given  clear instructions to have her return at any time if things deteriorate or he became overwhelmed at which point admission could be pursued.    Diagnosis:    ICD-10-CM    1. Anxiety reaction  F41.1        Disposition:  Discharged to home.    Scribe Disclosure:  I, Rosemarie Masterson, am serving as a scribe at 2:19 AM on 9/15/2020 to document services personally performed by Fidel Donaldson MD based on my observations and the provider's statements to me.   9/15/2020    EMERGENCY DEPARTMENT       Fidel Donaldson MD  09/15/20 [id number]

## 2020-09-15 NOTE — ED NOTES
MD at bedside speaking with pt.  Pam Garrido RN,.......................................... 9/15/2020   4:51 AM

## 2020-09-15 NOTE — ED NOTES
"Bed: ED18  Expected date:   Expected time:   Means of arrival:   Comments:  422  70F  eval  \"No covid\"  0206  "

## 2020-09-15 NOTE — ED NOTES
Pt getting up and looking out hallway door. Pt upset that  has not come in. Pt states her anxiety is starting to ramp up. Dr. Donaldson updated.

## 2020-09-15 NOTE — ED NOTES
"Pt declines signing the DEC Authorization to Release Protected Health Information. Stating \"There is a lot of things going on lately. I need to think about it.\"  "

## 2020-09-15 NOTE — ED NOTES
Pt given warm blanket and water. Pt updated on plan for DEC assessment via computer. Pt agreeable with plan.

## 2021-01-14 ENCOUNTER — HEALTH MAINTENANCE LETTER (OUTPATIENT)
Age: 71
End: 2021-01-14

## 2021-10-23 ENCOUNTER — HEALTH MAINTENANCE LETTER (OUTPATIENT)
Age: 71
End: 2021-10-23

## 2022-02-12 ENCOUNTER — HEALTH MAINTENANCE LETTER (OUTPATIENT)
Age: 72
End: 2022-02-12

## 2022-10-10 ENCOUNTER — HEALTH MAINTENANCE LETTER (OUTPATIENT)
Age: 72
End: 2022-10-10

## 2023-03-25 ENCOUNTER — HEALTH MAINTENANCE LETTER (OUTPATIENT)
Age: 73
End: 2023-03-25